# Patient Record
Sex: FEMALE | Race: WHITE
[De-identification: names, ages, dates, MRNs, and addresses within clinical notes are randomized per-mention and may not be internally consistent; named-entity substitution may affect disease eponyms.]

---

## 2020-07-07 ENCOUNTER — HOSPITAL ENCOUNTER (INPATIENT)
Dept: HOSPITAL 74 - YASAS | Age: 30
LOS: 5 days | Discharge: HOME | End: 2020-07-12
Attending: ALLERGY & IMMUNOLOGY | Admitting: ALLERGY & IMMUNOLOGY
Payer: COMMERCIAL

## 2020-07-07 VITALS — BODY MASS INDEX: 27.8 KG/M2

## 2020-07-07 DIAGNOSIS — Z91.013: ICD-10-CM

## 2020-07-07 DIAGNOSIS — F19.280: ICD-10-CM

## 2020-07-07 DIAGNOSIS — D50.9: ICD-10-CM

## 2020-07-07 DIAGNOSIS — Z56.0: ICD-10-CM

## 2020-07-07 DIAGNOSIS — F10.230: Primary | ICD-10-CM

## 2020-07-07 DIAGNOSIS — R56.1: ICD-10-CM

## 2020-07-07 DIAGNOSIS — F14.20: ICD-10-CM

## 2020-07-07 DIAGNOSIS — J45.909: ICD-10-CM

## 2020-07-07 DIAGNOSIS — F19.282: ICD-10-CM

## 2020-07-07 DIAGNOSIS — Z90.49: ICD-10-CM

## 2020-07-07 DIAGNOSIS — Z59.0: ICD-10-CM

## 2020-07-07 DIAGNOSIS — F41.9: ICD-10-CM

## 2020-07-07 LAB
ALBUMIN SERPL-MCNC: 3.5 G/DL (ref 3.4–5)
ALP SERPL-CCNC: 115 U/L (ref 45–117)
ALT SERPL-CCNC: 467 U/L (ref 13–61)
ANION GAP SERPL CALC-SCNC: 5 MMOL/L (ref 8–16)
AST SERPL-CCNC: 397 U/L (ref 15–37)
BILIRUB SERPL-MCNC: 0.6 MG/DL (ref 0.2–1)
BUN SERPL-MCNC: 10.1 MG/DL (ref 7–18)
CALCIUM SERPL-MCNC: 8.9 MG/DL (ref 8.5–10.1)
CHLORIDE SERPL-SCNC: 105 MMOL/L (ref 98–107)
CO2 SERPL-SCNC: 30 MMOL/L (ref 21–32)
CREAT SERPL-MCNC: 0.6 MG/DL (ref 0.55–1.3)
DEPRECATED RDW RBC AUTO: 18.6 % (ref 11.6–15.6)
GLUCOSE SERPL-MCNC: 68 MG/DL (ref 74–106)
HCT VFR BLD CALC: 31.5 % (ref 32.4–45.2)
HGB BLD-MCNC: 9.4 GM/DL (ref 10.7–15.3)
MCH RBC QN AUTO: 20.9 PG (ref 25.7–33.7)
MCHC RBC AUTO-ENTMCNC: 29.7 G/DL (ref 32–36)
MCV RBC: 70.1 FL (ref 80–96)
PLATELET # BLD AUTO: 367 K/MM3 (ref 134–434)
PMV BLD: 9.1 FL (ref 7.5–11.1)
POTASSIUM SERPLBLD-SCNC: 4.3 MMOL/L (ref 3.5–5.1)
PROT SERPL-MCNC: 7.6 G/DL (ref 6.4–8.2)
RBC # BLD AUTO: 4.5 M/MM3 (ref 3.6–5.2)
SODIUM SERPL-SCNC: 140 MMOL/L (ref 136–145)
WBC # BLD AUTO: 3.8 K/MM3 (ref 4–10)

## 2020-07-07 PROCEDURE — HZ2ZZZZ DETOXIFICATION SERVICES FOR SUBSTANCE ABUSE TREATMENT: ICD-10-PCS | Performed by: ALLERGY & IMMUNOLOGY

## 2020-07-07 PROCEDURE — U0003 INFECTIOUS AGENT DETECTION BY NUCLEIC ACID (DNA OR RNA); SEVERE ACUTE RESPIRATORY SYNDROME CORONAVIRUS 2 (SARS-COV-2) (CORONAVIRUS DISEASE [COVID-19]), AMPLIFIED PROBE TECHNIQUE, MAKING USE OF HIGH THROUGHPUT TECHNOLOGIES AS DESCRIBED BY CMS-2020-01-R: HCPCS

## 2020-07-07 RX ADMIN — Medication SCH MG: at 22:23

## 2020-07-07 RX ADMIN — LEVETIRACETAM SCH MG: 500 TABLET, FILM COATED ORAL at 22:23

## 2020-07-07 RX ADMIN — LEVETIRACETAM SCH MG: 500 TABLET, FILM COATED ORAL at 12:48

## 2020-07-07 RX ADMIN — HYDROXYZINE PAMOATE SCH: 25 CAPSULE ORAL at 22:45

## 2020-07-07 RX ADMIN — HYDROXYZINE PAMOATE SCH: 25 CAPSULE ORAL at 14:32

## 2020-07-07 RX ADMIN — HYDROXYZINE PAMOATE SCH: 25 CAPSULE ORAL at 18:01

## 2020-07-07 RX ADMIN — Medication SCH: at 22:45

## 2020-07-07 RX ADMIN — Medication SCH TAB: at 12:48

## 2020-07-07 SDOH — ECONOMIC STABILITY - HOUSING INSECURITY: HOMELESSNESS: Z59.0

## 2020-07-07 SDOH — ECONOMIC STABILITY - INCOME SECURITY: UNEMPLOYMENT, UNSPECIFIED: Z56.0
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
## 2020-07-07 NOTE — EKG
Test Reason : 

Blood Pressure : ***/*** mmHG

Vent. Rate : 078 BPM     Atrial Rate : 078 BPM

   P-R Int : 146 ms          QRS Dur : 080 ms

    QT Int : 384 ms       P-R-T Axes : 066 067 034 degrees

   QTc Int : 437 ms

 

NORMAL SINUS RHYTHM WITH SINUS ARRHYTHMIA

NORMAL ECG

NO PREVIOUS ECGS AVAILABLE

Confirmed by MD Edward, Juaquin ([id number]) on 7/7/2020 12:12:29 PM

 

Referred By:             Confirmed By:Juaquin Leon MD

## 2020-07-07 NOTE — BHS.RME
Substance Use & Tx History





- Substance Use History


  ** Alcohol


Substance amount: 2 PACK OF BEER 


Frequency of use: Daily


Substance route: Oral


Date of Last Use: 20





Physical/Psych/Mental Status





- Behavior


General Behavior: Increased activity (restlessness, agitation)


Eye Contact: Normal





- Cooperativeness


Cooperativeness: Cooperative





- Thinking


Thought Processes: Tight, Logical, Goal Directed





- Physical Health Problems


Is patient presently having any pain?: No


Does patient presently have any injuries (include location): No


Does patient currently have a fever: No


Is patient pregnant: No





CIWA


Nausea/Vomitin


Muscle Tremors: 2


Anxiety: 1-Mildly Anxious


Agitation: 2


Paroxysmal Sweats: 3


Orientation: 1-Uncertain about Date


Tacttile Disturbances: 0-None


Auditory Disturbances: 0-None


Visual Disturbances: 0-None


Headache: 2-Mild


CIWA-Ar Total Score: 13

## 2020-07-08 RX ADMIN — LEVETIRACETAM SCH MG: 500 TABLET, FILM COATED ORAL at 22:21

## 2020-07-08 RX ADMIN — HYDROXYZINE PAMOATE SCH: 25 CAPSULE ORAL at 05:55

## 2020-07-08 RX ADMIN — Medication SCH: at 22:25

## 2020-07-08 RX ADMIN — Medication SCH MG: at 22:20

## 2020-07-08 RX ADMIN — LEVETIRACETAM SCH MG: 500 TABLET, FILM COATED ORAL at 11:01

## 2020-07-08 RX ADMIN — Medication SCH TAB: at 11:01

## 2020-07-08 RX ADMIN — HYDROXYZINE PAMOATE SCH: 25 CAPSULE ORAL at 11:01

## 2020-07-08 NOTE — PN
[facility] CIWA





- CIWA Score


Nausea/Vomitin-No Nausea/No Vomiting


Muscle Tremors: 3


Anxiety: 2


Agitation: 3


Paroxysmal Sweats: 2


Orientation: 0-Oriented


Tacttile Disturbances: 0-None


Auditory Disturbances: 0-None


Visual Disturbances: 0-None


Headache: 0-None Present


CIWA-Ar Total Score: 10





[facility] Progress Note (SOAP)


Subjective: 





sweats


body aches


tired


interrupted sleep


Objective: 





20 11:56


                                   Vital Signs











Temperature  97.1 F L  20 05:38


 


Pulse Rate  95 H  20 09:04


 


Respiratory Rate  18   20 09:04


 


Blood Pressure  120/68   20 09:04


 


O2 Sat by Pulse Oximetry (%)  98   20 05:38








                                Laboratory Tests











  20





  11:27 12:15 12:15


 


WBC   3.8 L 


 


RBC   4.50 


 


Hgb   9.4 L 


 


Hct   31.5 L 


 


MCV   70.1 L 


 


MCH   20.9 L 


 


MCHC   29.7 L 


 


RDW   18.6 H 


 


Plt Count   367 


 


MPV   9.1 


 


Sodium    140


 


Potassium    4.3


 


Chloride    105


 


Carbon Dioxide    30


 


Anion Gap    5 L


 


BUN    10.1


 


Creatinine    0.6


 


Est GFR (CKD-EPI)AfAm    142.76


 


Est GFR (CKD-EPI)NonAf    123.17


 


Random Glucose    68 L


 


Calcium    8.9


 


Total Bilirubin    0.6


 


AST    397 H


 


ALT    467 H


 


Alkaline Phosphatase    115


 


Total Protein    7.6


 


Albumin    3.5


 


POC Urine HCG, Qual  Negative  


 


Syphilis Serology   














  20





  12:15


 


WBC 


 


RBC 


 


Hgb 


 


Hct 


 


MCV 


 


MCH 


 


MCHC 


 


RDW 


 


Plt Count 


 


MPV 


 


Sodium 


 


Potassium 


 


Chloride 


 


Carbon Dioxide 


 


Anion Gap 


 


BUN 


 


Creatinine 


 


Est GFR (CKD-EPI)AfAm 


 


Est GFR (CKD-EPI)NonAf 


 


Random Glucose 


 


Calcium 


 


Total Bilirubin 


 


AST 


 


ALT 


 


Alkaline Phosphatase 


 


Total Protein 


 


Albumin 


 


POC Urine HCG, Qual 


 


Syphilis Serology  Non-reactive








labs noted


mildly low H:H noted


elevated liver enzymes noted


tylenol d/c 


will repeat labs


Assessment: 





20 11:59


withdrawals


Plan: 





continue detox


increase fluids


f/u pending repeated labs

## 2020-07-09 LAB
ALBUMIN SERPL-MCNC: 3 G/DL (ref 3.4–5)
ALP SERPL-CCNC: 118 U/L (ref 45–117)
ALT SERPL-CCNC: 408 U/L (ref 13–61)
ANION GAP SERPL CALC-SCNC: 4 MMOL/L (ref 8–16)
ANISOCYTOSIS BLD QL: (no result)
AST SERPL-CCNC: 182 U/L (ref 15–37)
BASOPHILS # BLD: 1.3 % (ref 0–2)
BILIRUB SERPL-MCNC: 0.5 MG/DL (ref 0.2–1)
BUN SERPL-MCNC: 18.5 MG/DL (ref 7–18)
CALCIUM SERPL-MCNC: 8.8 MG/DL (ref 8.5–10.1)
CHLORIDE SERPL-SCNC: 106 MMOL/L (ref 98–107)
CO2 SERPL-SCNC: 28 MMOL/L (ref 21–32)
CREAT SERPL-MCNC: 0.7 MG/DL (ref 0.55–1.3)
DEPRECATED RDW RBC AUTO: 19.1 % (ref 11.6–15.6)
EOSINOPHIL # BLD: 2.9 % (ref 0–4.5)
GLUCOSE SERPL-MCNC: 83 MG/DL (ref 74–106)
HCT VFR BLD CALC: 31.8 % (ref 32.4–45.2)
HGB BLD-MCNC: 9.7 GM/DL (ref 10.7–15.3)
LYMPHOCYTES # BLD: 53.8 % (ref 8–40)
MACROCYTES BLD QL: 0
MCH RBC QN AUTO: 20.9 PG (ref 25.7–33.7)
MCHC RBC AUTO-ENTMCNC: 30.6 G/DL (ref 32–36)
MCV RBC: 68.4 FL (ref 80–96)
MONOCYTES # BLD AUTO: 7.8 % (ref 3.8–10.2)
NEUTROPHILS # BLD: 34.2 % (ref 42.8–82.8)
PLATELET # BLD AUTO: 376 K/MM3 (ref 134–434)
PLATELET BLD QL SMEAR: NORMAL
PMV BLD: 9 FL (ref 7.5–11.1)
POTASSIUM SERPLBLD-SCNC: 4.5 MMOL/L (ref 3.5–5.1)
PROT SERPL-MCNC: 6.8 G/DL (ref 6.4–8.2)
RBC # BLD AUTO: 4.65 M/MM3 (ref 3.6–5.2)
SODIUM SERPL-SCNC: 138 MMOL/L (ref 136–145)
WBC # BLD AUTO: 5.2 K/MM3 (ref 4–10)

## 2020-07-09 RX ADMIN — Medication SCH TAB: at 10:34

## 2020-07-09 RX ADMIN — Medication SCH: at 22:13

## 2020-07-09 RX ADMIN — FERROUS SULFATE TAB EC 324 MG (65 MG FE EQUIVALENT) SCH MG: 324 (65 FE) TABLET DELAYED RESPONSE at 17:44

## 2020-07-09 RX ADMIN — LEVETIRACETAM SCH MG: 500 TABLET, FILM COATED ORAL at 22:12

## 2020-07-09 RX ADMIN — Medication SCH MG: at 22:12

## 2020-07-09 RX ADMIN — LEVETIRACETAM SCH MG: 500 TABLET, FILM COATED ORAL at 10:34

## 2020-07-09 NOTE — PN
S CIWA





- CIWA Score


Nausea/Vomitin-No Nausea/No Vomiting


Muscle Tremors: 2


Anxiety: 1-Mildly Anxious


Agitation: 1-Slight > Activity


Paroxysmal Sweats: 1-Minimal Palms Moist


Orientation: 0-Oriented


Tacttile Disturbances: 0-None


Auditory Disturbances: 0-None


Visual Disturbances: 0-None


Headache: 0-None Present


CIWA-Ar Total Score: 5





[facility] Progress Note (SOAP)


Subjective: 





sleepy


sweats


tired


Objective: 





20 11:23


                                   Vital Signs











Temperature  97.3 F L  20 08:41


 


Pulse Rate  100 H  20 08:41


 


Respiratory Rate  16   20 08:41


 


Blood Pressure  110/64   20 08:41


 


O2 Sat by Pulse Oximetry (%)  99   20 05:33








                                Laboratory Tests











  20





  11:27 12:15 12:15


 


WBC   3.8 L 


 


RBC   4.50 


 


Hgb   9.4 L 


 


Hct   31.5 L 


 


MCV   70.1 L 


 


MCH   20.9 L 


 


MCHC   29.7 L 


 


RDW   18.6 H 


 


Plt Count   367 


 


MPV   9.1 


 


Sodium    140


 


Potassium    4.3


 


Chloride    105


 


Carbon Dioxide    30


 


Anion Gap    5 L


 


BUN    10.1


 


Creatinine    0.6


 


Est GFR (CKD-EPI)AfAm    142.76


 


Est GFR (CKD-EPI)NonAf    123.17


 


Random Glucose    68 L


 


Calcium    8.9


 


Total Bilirubin    0.6


 


AST    397 H


 


ALT    467 H


 


Alkaline Phosphatase    115


 


Total Protein    7.6


 


Albumin    3.5


 


POC Urine HCG, Qual  Negative  


 


Syphilis Serology   


 


COVID-19 ([facility])   














  20





  12:15 12:15


 


WBC  


 


RBC  


 


Hgb  


 


Hct  


 


MCV  


 


MCH  


 


MCHC  


 


RDW  


 


Plt Count  


 


MPV  


 


Sodium  


 


Potassium  


 


Chloride  


 


Carbon Dioxide  


 


Anion Gap  


 


BUN  


 


Creatinine  


 


Est GFR (CKD-EPI)AfAm  


 


Est GFR (CKD-EPI)NonAf  


 


Random Glucose  


 


Calcium  


 


Total Bilirubin  


 


AST  


 


ALT  


 


Alkaline Phosphatase  


 


Total Protein  


 


Albumin  


 


POC Urine HCG, Qual  


 


Syphilis Serology  Non-reactive 


 


COVID-19 ([facility])   Not detected








repeated labs pending


aaox3


lying in bed


no acute distress


Assessment: 





20 11:24


mild withdrawals


Plan: 





hold 10am libirum


increase fluids


continue taper as ordered

## 2020-07-10 RX ADMIN — FERROUS SULFATE TAB EC 324 MG (65 MG FE EQUIVALENT) SCH MG: 324 (65 FE) TABLET DELAYED RESPONSE at 11:48

## 2020-07-10 RX ADMIN — LEVETIRACETAM SCH MG: 500 TABLET, FILM COATED ORAL at 22:12

## 2020-07-10 RX ADMIN — Medication SCH MG: at 22:12

## 2020-07-10 RX ADMIN — Medication SCH TAB: at 10:26

## 2020-07-10 RX ADMIN — Medication SCH MG: at 22:13

## 2020-07-10 RX ADMIN — FERROUS SULFATE TAB EC 324 MG (65 MG FE EQUIVALENT) SCH MG: 324 (65 FE) TABLET DELAYED RESPONSE at 17:06

## 2020-07-10 RX ADMIN — LEVETIRACETAM SCH MG: 500 TABLET, FILM COATED ORAL at 10:26

## 2020-07-10 RX ADMIN — FERROUS SULFATE TAB EC 324 MG (65 MG FE EQUIVALENT) SCH MG: 324 (65 FE) TABLET DELAYED RESPONSE at 07:27

## 2020-07-10 NOTE — PN
S CIWA





- CIWA Score


Nausea/Vomitin-No Nausea/No Vomiting


Muscle Tremors: 2


Anxiety: 1-Mildly Anxious


Agitation: 1-Slight > Activity


Paroxysmal Sweats: 1-Minimal Palms Moist


Orientation: 0-Oriented


Tacttile Disturbances: 0-None


Auditory Disturbances: 0-None


Visual Disturbances: 0-None


Headache: 0-None Present


CIWA-Ar Total Score: 5





[facility] Progress Note (SOAP)


Subjective: 





sweats


interrupted sleep





Objective: 





07/10/20 09:20


                                   Vital Signs











Temperature  97.5 F L  07/10/20 05:44


 


Pulse Rate  87   07/10/20 05:44


 


Respiratory Rate  16   07/10/20 05:44


 


Blood Pressure  110/61   07/10/20 05:44


 


O2 Sat by Pulse Oximetry (%)  100   07/10/20 05:44








                                Laboratory Tests











  20





  11:27 12:15 12:15


 


WBC   3.8 L 


 


RBC   4.50 


 


Hgb   9.4 L 


 


Hct   31.5 L 


 


MCV   70.1 L 


 


MCH   20.9 L 


 


MCHC   29.7 L 


 


RDW   18.6 H 


 


Plt Count   367 


 


MPV   9.1 


 


Absolute Neuts (auto)   


 


Neutrophils %   


 


Lymphocytes %   


 


Monocytes %   


 


Eosinophils %   


 


Basophils %   


 


Nucleated RBC %   


 


Hypochromia   


 


Platelet Estimate   


 


Polychromasia   


 


Poikilocytosis   


 


Anisocytosis   


 


Microcytosis   


 


Macrocytosis   


 


Schistocytes   


 


Sodium    140


 


Potassium    4.3


 


Chloride    105


 


Carbon Dioxide    30


 


Anion Gap    5 L


 


BUN    10.1


 


Creatinine    0.6


 


Est GFR (CKD-EPI)AfAm    142.76


 


Est GFR (CKD-EPI)NonAf    123.17


 


Random Glucose    68 L


 


Calcium    8.9


 


Total Bilirubin    0.6


 


AST    397 H


 


ALT    467 H


 


Alkaline Phosphatase    115


 


Total Protein    7.6


 


Albumin    3.5


 


POC Urine HCG, Qual  Negative  


 


Syphilis Serology   


 


COVID-19 ([facility])   














  20





  12:15 12:15 08:00


 


WBC    5.2


 


RBC    4.65


 


Hgb    9.7 L


 


Hct    31.8 L


 


MCV    68.4 L


 


MCH    20.9 L


 


MCHC    30.6 L


 


RDW    19.1 H


 


Plt Count    376


 


MPV    9.0


 


Absolute Neuts (auto)    1.8


 


Neutrophils %    34.2 L


 


Lymphocytes %    53.8 H


 


Monocytes %    7.8


 


Eosinophils %    2.9


 


Basophils %    1.3


 


Nucleated RBC %    0


 


Hypochromia    2+


 


Platelet Estimate    Normal


 


Polychromasia    1+


 


Poikilocytosis    1+


 


Anisocytosis    3+


 


Microcytosis    3+


 


Macrocytosis    0


 


Schistocytes    1+


 


Sodium   


 


Potassium   


 


Chloride   


 


Carbon Dioxide   


 


Anion Gap   


 


BUN   


 


Creatinine   


 


Est GFR (CKD-EPI)AfAm   


 


Est GFR (CKD-EPI)NonAf   


 


Random Glucose   


 


Calcium   


 


Total Bilirubin   


 


AST   


 


ALT   


 


Alkaline Phosphatase   


 


Total Protein   


 


Albumin   


 


POC Urine HCG, Qual   


 


Syphilis Serology  Non-reactive  


 


COVID-19 ([facility])   Not detected 














  20





  08:00


 


WBC 


 


RBC 


 


Hgb 


 


Hct 


 


MCV 


 


MCH 


 


MCHC 


 


RDW 


 


Plt Count 


 


MPV 


 


Absolute Neuts (auto) 


 


Neutrophils % 


 


Lymphocytes % 


 


Monocytes % 


 


Eosinophils % 


 


Basophils % 


 


Nucleated RBC % 


 


Hypochromia 


 


Platelet Estimate 


 


Polychromasia 


 


Poikilocytosis 


 


Anisocytosis 


 


Microcytosis 


 


Macrocytosis 


 


Schistocytes 


 


Sodium  138


 


Potassium  4.5


 


Chloride  106


 


Carbon Dioxide  28


 


Anion Gap  4 L


 


BUN  18.5 H


 


Creatinine  0.7


 


Est GFR (CKD-EPI)AfAm  135.70


 


Est GFR (CKD-EPI)NonAf  117.08


 


Random Glucose  83


 


Calcium  8.8


 


Total Bilirubin  0.5


 


AST  182 H


 


ALT  408 H


 


Alkaline Phosphatase  118 H


 


Total Protein  6.8


 


Albumin  3.0 L


 


POC Urine HCG, Qual 


 


Syphilis Serology 


 


COVID-19 ([facility]) 








liver enzymes show little improvement


H:H low; iron supplements ordered


encouraged fluids


aaox3


ambulating


no acute distress





Assessment: 





07/10/20 09:24


withdrawals


Plan: 





continue detox


increase fluids

## 2020-07-10 NOTE — PN
[facility] Progress Note


Note: 





I was called to the floor to evaluate patient who was disruptive and wanted to 

leave against medical advice because was not allowed to go downstairs to see her

boyfriend on another floor. This is her first admission to [facility]. Importance of 

completing detox and protocols of the facility reinforced. Patient verbalized 

understanding of instructions and signed the treatment agreement.


                                   Vital Signs











Temperature  96.9 F L  07/10/20 17:04


 


Pulse Rate  110 H  07/10/20 17:04


 


Respiratory Rate  19   07/10/20 17:04


 


Blood Pressure  121/58 L  07/10/20 17:04


 


O2 Sat by Pulse Oximetry (%)  99   07/10/20 12:40











                             Laboratory Last Values











WBC  5.2 K/mm3 (4.0-10.0)   07/09/20  08:00    


 


RBC  4.65 M/mm3 (3.60-5.2)   07/09/20  08:00    


 


Hgb  9.7 GM/dL (10.7-15.3)  L  07/09/20  08:00    


 


Hct  31.8 % (32.4-45.2)  L  07/09/20  08:00    


 


MCV  68.4 fl (80-96)  L  07/09/20  08:00    


 


MCH  20.9 pg (25.7-33.7)  L  07/09/20  08:00    


 


MCHC  30.6 g/dl (32.0-36.0)  L  07/09/20  08:00    


 


RDW  19.1 % (11.6-15.6)  H  07/09/20  08:00    


 


Plt Count  376 K/MM3 (134-434)   07/09/20  08:00    


 


MPV  9.0 fl (7.5-11.1)   07/09/20  08:00    


 


Absolute Neuts (auto)  1.8 K/mm3 (1.5-8.0)   07/09/20  08:00    


 


Neutrophils %  34.2 % (42.8-82.8)  L  07/09/20  08:00    


 


Lymphocytes %  53.8 % (8-40)  H  07/09/20  08:00    


 


Monocytes %  7.8 % (3.8-10.2)   07/09/20  08:00    


 


Eosinophils %  2.9 % (0-4.5)   07/09/20  08:00    


 


Basophils %  1.3 % (0-2.0)   07/09/20  08:00    


 


Nucleated RBC %  0 % (0-0)   07/09/20  08:00    


 


Hypochromia  2+   07/09/20  08:00    


 


Platelet Estimate  Normal   07/09/20  08:00    


 


Polychromasia  1+   07/09/20  08:00    


 


Poikilocytosis  1+   07/09/20  08:00    


 


Anisocytosis  3+   07/09/20  08:00    


 


Microcytosis  3+   07/09/20  08:00    


 


Macrocytosis  0   07/09/20  08:00    


 


Schistocytes  1+   07/09/20  08:00    


 


Sodium  138 mmol/L (136-145)   07/09/20  08:00    


 


Potassium  4.5 mmol/L (3.5-5.1)   07/09/20  08:00    


 


Chloride  106 mmol/L ()   07/09/20  08:00    


 


Carbon Dioxide  28 mmol/L (21-32)   07/09/20  08:00    


 


Anion Gap  4 MMOL/L (8-16)  L  07/09/20  08:00    


 


BUN  18.5 mg/dL (7-18)  H  07/09/20  08:00    


 


Creatinine  0.7 mg/dL (0.55-1.3)   07/09/20  08:00    


 


Est GFR (CKD-EPI)AfAm  135.70   07/09/20  08:00    


 


Est GFR (CKD-EPI)NonAf  117.08   07/09/20  08:00    


 


Random Glucose  83 mg/dL ()   07/09/20  08:00    


 


Calcium  8.8 mg/dL (8.5-10.1)   07/09/20  08:00    


 


Total Bilirubin  0.5 mg/dL (0.2-1)   07/09/20  08:00    


 


AST  182 U/L (15-37)  H  07/09/20  08:00    


 


ALT  408 U/L (13-61)  H  07/09/20  08:00    


 


Alkaline Phosphatase  118 U/L ()  H  07/09/20  08:00    


 


Total Protein  6.8 g/dl (6.4-8.2)   07/09/20  08:00    


 


Albumin  3.0 g/dl (3.4-5.0)  L  07/09/20  08:00    


 


POC Urine HCG, Qual  Negative   07/07/20  11:27    


 


Syphilis Serology  Non-reactive  (NONREACTIVE)   07/07/20  12:15    


 


COVID-19 ([facility])  Not detected  (Not Detected)   07/07/20  12:15    








 Action: Continue detox

## 2020-07-11 RX ADMIN — LEVETIRACETAM SCH MG: 500 TABLET, FILM COATED ORAL at 22:20

## 2020-07-11 RX ADMIN — Medication SCH MG: at 22:19

## 2020-07-11 RX ADMIN — FERROUS SULFATE TAB EC 324 MG (65 MG FE EQUIVALENT) SCH MG: 324 (65 FE) TABLET DELAYED RESPONSE at 11:57

## 2020-07-11 RX ADMIN — Medication SCH TAB: at 10:29

## 2020-07-11 RX ADMIN — FERROUS SULFATE TAB EC 324 MG (65 MG FE EQUIVALENT) SCH MG: 324 (65 FE) TABLET DELAYED RESPONSE at 17:18

## 2020-07-11 RX ADMIN — LEVETIRACETAM SCH MG: 500 TABLET, FILM COATED ORAL at 10:28

## 2020-07-11 RX ADMIN — Medication SCH MG: at 22:20

## 2020-07-11 RX ADMIN — FERROUS SULFATE TAB EC 324 MG (65 MG FE EQUIVALENT) SCH MG: 324 (65 FE) TABLET DELAYED RESPONSE at 07:30

## 2020-07-11 NOTE — PN
[facility] CIWA





- CIWA Score


Nausea/Vomitin-No Nausea/No Vomiting


Muscle Tremors: 1-None Visible, but Felt


Anxiety: 2


Agitation: 1-Slight > Activity


Paroxysmal Sweats: 1-Minimal Palms Moist


Orientation: 0-Oriented


Tacttile Disturbances: 0-None


Auditory Disturbances: 0-None


Visual Disturbances: 0-None


Headache: 0-None Present


CIWA-Ar Total Score: 5





[facility] Progress Note (SOAP)


Subjective: 





Complaints of mild anxiety and sweats.


Objective: 





20 15:15


                                   Vital Signs











  20





  08:56 12:44


 


Temperature 97.1 F L 97.5 F L


 


Pulse Rate 92 H 105 H


 


Respiratory 18 18





Rate  


 


Blood Pressure 116/66 120/66


 


O2 Sat by Pulse  100





Oximetry (%)  








                             Laboratory Last Values











WBC  5.2 K/mm3 (4.0-10.0)   20  08:00    


 


RBC  4.65 M/mm3 (3.60-5.2)   20  08:00    


 


Hgb  9.7 GM/dL (10.7-15.3)  L  20  08:00    


 


Hct  31.8 % (32.4-45.2)  L  20  08:00    


 


MCV  68.4 fl (80-96)  L  20  08:00    


 


MCH  20.9 pg (25.7-33.7)  L  20  08:00    


 


MCHC  30.6 g/dl (32.0-36.0)  L  20  08:00    


 


RDW  19.1 % (11.6-15.6)  H  20  08:00    


 


Plt Count  376 K/MM3 (134-434)   20  08:00    


 


MPV  9.0 fl (7.5-11.1)   20  08:00    


 


Absolute Neuts (auto)  1.8 K/mm3 (1.5-8.0)   20  08:00    


 


Neutrophils %  34.2 % (42.8-82.8)  L  20  08:00    


 


Lymphocytes %  53.8 % (8-40)  H  20  08:00    


 


Monocytes %  7.8 % (3.8-10.2)   20  08:00    


 


Eosinophils %  2.9 % (0-4.5)   20  08:00    


 


Basophils %  1.3 % (0-2.0)   20  08:00    


 


Nucleated RBC %  0 % (0-0)   20  08:00    


 


Hypochromia  2+   20  08:00    


 


Platelet Estimate  Normal   20  08:00    


 


Polychromasia  1+   20  08:00    


 


Poikilocytosis  1+   20  08:00    


 


Anisocytosis  3+   20  08:00    


 


Microcytosis  3+   20  08:00    


 


Macrocytosis  0   20  08:00    


 


Schistocytes  1+   20  08:00    


 


Sodium  138 mmol/L (136-145)   20  08:00    


 


Potassium  4.5 mmol/L (3.5-5.1)   20  08:00    


 


Chloride  106 mmol/L ()   20  08:00    


 


Carbon Dioxide  28 mmol/L (21-32)   20  08:00    


 


Anion Gap  4 MMOL/L (8-16)  L  20  08:00    


 


BUN  18.5 mg/dL (7-18)  H  20  08:00    


 


Creatinine  0.7 mg/dL (0.55-1.3)   20  08:00    


 


Est GFR (CKD-EPI)AfAm  135.70   20  08:00    


 


Est GFR (CKD-EPI)NonAf  117.08   20  08:00    


 


Random Glucose  83 mg/dL ()   20  08:00    


 


Calcium  8.8 mg/dL (8.5-10.1)   20  08:00    


 


Total Bilirubin  0.5 mg/dL (0.2-1)   20  08:00    


 


AST  182 U/L (15-37)  H  20  08:00    


 


ALT  408 U/L (13-61)  H  20  08:00    


 


Alkaline Phosphatase  118 U/L ()  H  20  08:00    


 


Total Protein  6.8 g/dl (6.4-8.2)   20  08:00    


 


Albumin  3.0 g/dl (3.4-5.0)  L  20  08:00    


 


POC Urine HCG, Qual  Negative   20  11:27    


 


Syphilis Serology  Non-reactive  (NONREACTIVE)   20  12:15    


 


COVID-19 ([facility])  Not detected  (Not Detected)   20  12:15    








Labs noted with elevated liver enzymes.


Assessment: 





20 15:16


Alert and oriented x3, in no acute respiratory distress.


Full ROM, ambulatory on unit. 


Withdrawal symptoms.


For D/C in AM.


Plan: 





Continue detox protocol.


D/C in AM


Rx for Ferrous sulfate and Colace sent to preferred pharmacy.

## 2020-07-12 VITALS — HEART RATE: 97 BPM | SYSTOLIC BLOOD PRESSURE: 117 MMHG | DIASTOLIC BLOOD PRESSURE: 68 MMHG

## 2020-07-12 VITALS — TEMPERATURE: 97.1 F

## 2020-07-12 RX ADMIN — Medication SCH TAB: at 10:11

## 2020-07-12 RX ADMIN — LEVETIRACETAM SCH MG: 500 TABLET, FILM COATED ORAL at 10:11

## 2020-07-12 RX ADMIN — FERROUS SULFATE TAB EC 324 MG (65 MG FE EQUIVALENT) SCH MG: 324 (65 FE) TABLET DELAYED RESPONSE at 07:39

## 2020-07-12 NOTE — DS
[facility] Detox Discharge Summary


Admission Date: 


07/07/20





Discharge Date: 07/12/20





- History


Present History: Alcohol Dependence, Cocaine Dependence


Additional Comments: 





Patient completed detox successfully and discharged safely in stable 

condition.Instructed to follow up with her PCP within 1 week.


Pertinent Past History: 





Asthma





Seizure





Anemia





- Physical Exam Results


Vital Signs: 


                                   Vital Signs











Temperature  97.1 F L  07/12/20 08:35


 


Pulse Rate  97 H  07/12/20 08:35


 


Respiratory Rate  17   07/12/20 08:35


 


Blood Pressure  117/68   07/12/20 08:35


 


O2 Sat by Pulse Oximetry (%)  100   07/12/20 05:44











Pertinent Admission Physical Exam Findings: 





Withdrawal sxs





                                Laboratory Tests











  07/07/20 07/07/20 07/07/20





  11:27 12:15 12:15


 


WBC   3.8 L 


 


RBC   4.50 


 


Hgb   9.4 L 


 


Hct   31.5 L 


 


MCV   70.1 L 


 


MCH   20.9 L 


 


MCHC   29.7 L 


 


RDW   18.6 H 


 


Plt Count   367 


 


MPV   9.1 


 


Absolute Neuts (auto)   


 


Neutrophils %   


 


Lymphocytes %   


 


Monocytes %   


 


Eosinophils %   


 


Basophils %   


 


Nucleated RBC %   


 


Hypochromia   


 


Platelet Estimate   


 


Polychromasia   


 


Poikilocytosis   


 


Anisocytosis   


 


Microcytosis   


 


Macrocytosis   


 


Schistocytes   


 


Sodium    140


 


Potassium    4.3


 


Chloride    105


 


Carbon Dioxide    30


 


Anion Gap    5 L


 


BUN    10.1


 


Creatinine    0.6


 


Est GFR (CKD-EPI)AfAm    142.76


 


Est GFR (CKD-EPI)NonAf    123.17


 


Random Glucose    68 L


 


Calcium    8.9


 


Total Bilirubin    0.6


 


AST    397 H


 


ALT    467 H


 


Alkaline Phosphatase    115


 


Total Protein    7.6


 


Albumin    3.5


 


POC Urine HCG, Qual  Negative  


 


Syphilis Serology   


 


COVID-19 ([facility])   














  07/07/20 07/07/20 07/09/20





  12:15 12:15 08:00


 


WBC    5.2


 


RBC    4.65


 


Hgb    9.7 L


 


Hct    31.8 L


 


MCV    68.4 L


 


MCH    20.9 L


 


MCHC    30.6 L


 


RDW    19.1 H


 


Plt Count    376


 


MPV    9.0


 


Absolute Neuts (auto)    1.8


 


Neutrophils %    34.2 L


 


Lymphocytes %    53.8 H


 


Monocytes %    7.8


 


Eosinophils %    2.9


 


Basophils %    1.3


 


Nucleated RBC %    0


 


Hypochromia    2+


 


Platelet Estimate    Normal


 


Polychromasia    1+


 


Poikilocytosis    1+


 


Anisocytosis    3+


 


Microcytosis    3+


 


Macrocytosis    0


 


Schistocytes    1+


 


Sodium   


 


Potassium   


 


Chloride   


 


Carbon Dioxide   


 


Anion Gap   


 


BUN   


 


Creatinine   


 


Est GFR (CKD-EPI)AfAm   


 


Est GFR (CKD-EPI)NonAf   


 


Random Glucose   


 


Calcium   


 


Total Bilirubin   


 


AST   


 


ALT   


 


Alkaline Phosphatase   


 


Total Protein   


 


Albumin   


 


POC Urine HCG, Qual   


 


Syphilis Serology  Non-reactive  


 


COVID-19 ([facility])   Not detected 














  07/09/20





  08:00


 


WBC 


 


RBC 


 


Hgb 


 


Hct 


 


MCV 


 


MCH 


 


MCHC 


 


RDW 


 


Plt Count 


 


MPV 


 


Absolute Neuts (auto) 


 


Neutrophils % 


 


Lymphocytes % 


 


Monocytes % 


 


Eosinophils % 


 


Basophils % 


 


Nucleated RBC % 


 


Hypochromia 


 


Platelet Estimate 


 


Polychromasia 


 


Poikilocytosis 


 


Anisocytosis 


 


Microcytosis 


 


Macrocytosis 


 


Schistocytes 


 


Sodium  138


 


Potassium  4.5


 


Chloride  106


 


Carbon Dioxide  28


 


Anion Gap  4 L


 


BUN  18.5 H


 


Creatinine  0.7


 


Est GFR (CKD-EPI)AfAm  135.70


 


Est GFR (CKD-EPI)NonAf  117.08


 


Random Glucose  83


 


Calcium  8.8


 


Total Bilirubin  0.5


 


AST  182 H


 


ALT  408 H


 


Alkaline Phosphatase  118 H


 


Total Protein  6.8


 


Albumin  3.0 L


 


POC Urine HCG, Qual 


 


Syphilis Serology 


 


COVID-19 ([facility]) 








Labs reviewed: elevated LFTs (high, trending downward slowly) most likely due to

chronic alcoholism and possible hep c/liver disease, anemia (has hx of anemia); 

patient to follow up with PCP for management





- Treatment


Hospital Course: Detox Protocol Followed, Detoxed Safely, Responded well, 

Discharged Condition Good





- Medication


Discharge Medications: 


Ambulatory Orders





Quetiapine Fumarate [Seroquel -] 50 mg PO HS #30 tablet 07/10/20 


Quetiapine Fumarate [Seroquel -] 50 mg PO HS #30 tablet 07/10/20 


levETIRAcetam [Keppra -] 500 mg PO BID #60 tablet 07/10/20 


Docusate Sodium [Colace] 100 mg PO TID 30 Days #90 capsule 07/11/20 


Ferrous Sulfate 325 mg PO TID 30 Days #90 tablet 07/11/20 











- Diagnosis


(1) Elevated LFTs


Status: Acute   





(2) Alcohol dependence with withdrawal, uncomplicated


Status: Acute   





(3) Cocaine dependence


Status: Acute   





(4) Anemia, iron deficiency


Status: Chronic   





(5) Anxiety disorder


Status: Chronic   





(6) Asthma


Status: Chronic   





(7) Generalized seizure disorder


Status: Chronic   





- AMA


Did Patient Leave Against Medical Advice: No (Instructed to follow up with PCP 

within one week)

## 2020-09-30 NOTE — [facility].RME
Substance Use & Tx History





- Substance Use History


  ** Alcohol


Substance amount: 2 six packs beers


Frequency of use: Daily


Substance route: Oral


Date of Last Use: 20 (started age 19)





  ** Cocaine-Crack


Substance amount: $200


Frequency of use: Daily


Substance route: Inhalation (ex: sniffing or snorting), Smoking


Date of Last Use: 20 (started age 29)





  ** Cocaine- Powder


Substance amount: $150


Frequency of use: Daily


Substance route: Injection (ex: intravenous or skin popping)


Date of Last Use: 20 (started age 19)





Physical/Psych/Mental Status





- Behavior


General Behavior: Increased activity (restlessness, agitation)


Eye Contact: Normal





- Cooperativeness


Cooperativeness: Cooperative





- Thinking


Thought Processes: Tight, Logical, Goal Directed





- Physical Health Problems


Is patient presently having any pain?: No


Does patient presently have any injuries (include location): No


Does patient currently have a fever: No


Is patient pregnant: No





CIWA


Nausea/Vomitin-No Nausea/No Vomiting


Muscle Tremors: 1-None Visible, but Felt


Anxiety: 2


Agitation: 1-Slight > Activity


Paroxysmal Sweats: 1-Minimal Palms Moist


Orientation: 0-Oriented


Tacttile Disturbances: 0-None


Auditory Disturbances: 0-None


Visual Disturbances: 0-None


Headache: 0-None Present


CIWA-Ar Total Score: 5





Treatment Recommendation





- Level of Care


Level of Care: Outpatient (Patient was IUP 2 weeks pregnant, confirmed by urine 

pregnancy test.  She was advised that we do not take pregnant females for detox 

as it is high risk for early .  She was offered a referral to outpatient

program.)